# Patient Record
Sex: MALE | Race: WHITE | NOT HISPANIC OR LATINO | URBAN - METROPOLITAN AREA
[De-identification: names, ages, dates, MRNs, and addresses within clinical notes are randomized per-mention and may not be internally consistent; named-entity substitution may affect disease eponyms.]

---

## 2018-07-23 ENCOUNTER — OUTPATIENT (OUTPATIENT)
Dept: OUTPATIENT SERVICES | Facility: HOSPITAL | Age: 53
LOS: 1 days | Discharge: HOME | End: 2018-07-23
Payer: COMMERCIAL

## 2018-07-23 VITALS
OXYGEN SATURATION: 97 % | DIASTOLIC BLOOD PRESSURE: 78 MMHG | RESPIRATION RATE: 16 BRPM | SYSTOLIC BLOOD PRESSURE: 125 MMHG | WEIGHT: 240.08 LBS | HEART RATE: 61 BPM | TEMPERATURE: 98 F | HEIGHT: 73 IN

## 2018-07-23 DIAGNOSIS — I25.9 CHRONIC ISCHEMIC HEART DISEASE, UNSPECIFIED: ICD-10-CM

## 2018-07-23 DIAGNOSIS — Z95.5 PRESENCE OF CORONARY ANGIOPLASTY IMPLANT AND GRAFT: Chronic | ICD-10-CM

## 2018-07-23 DIAGNOSIS — Z90.89 ACQUIRED ABSENCE OF OTHER ORGANS: Chronic | ICD-10-CM

## 2018-07-23 LAB
ANION GAP SERPL CALC-SCNC: 11 MMOL/L — SIGNIFICANT CHANGE UP (ref 5–17)
BUN SERPL-MCNC: 23 MG/DL — SIGNIFICANT CHANGE UP (ref 7–23)
CALCIUM SERPL-MCNC: 9.2 MG/DL — SIGNIFICANT CHANGE UP (ref 8.4–10.5)
CHLORIDE SERPL-SCNC: 107 MMOL/L — SIGNIFICANT CHANGE UP (ref 96–108)
CO2 SERPL-SCNC: 24 MMOL/L — SIGNIFICANT CHANGE UP (ref 22–31)
CREAT SERPL-MCNC: 1.15 MG/DL — SIGNIFICANT CHANGE UP (ref 0.5–1.3)
GLUCOSE SERPL-MCNC: 98 MG/DL — SIGNIFICANT CHANGE UP (ref 70–99)
HCT VFR BLD CALC: 46.9 % — SIGNIFICANT CHANGE UP (ref 39–50)
HGB BLD-MCNC: 14.8 G/DL — SIGNIFICANT CHANGE UP (ref 13–17)
MCHC RBC-ENTMCNC: 28.7 PG — SIGNIFICANT CHANGE UP (ref 27–34)
MCHC RBC-ENTMCNC: 31.5 GM/DL — LOW (ref 32–36)
MCV RBC AUTO: 91.1 FL — SIGNIFICANT CHANGE UP (ref 80–100)
PLATELET # BLD AUTO: 230 K/UL — SIGNIFICANT CHANGE UP (ref 150–400)
POTASSIUM SERPL-MCNC: 4.5 MMOL/L — SIGNIFICANT CHANGE UP (ref 3.5–5.3)
POTASSIUM SERPL-SCNC: 4.5 MMOL/L — SIGNIFICANT CHANGE UP (ref 3.5–5.3)
RBC # BLD: 5.15 M/UL — SIGNIFICANT CHANGE UP (ref 4.2–5.8)
RBC # FLD: 11.8 % — SIGNIFICANT CHANGE UP (ref 10.3–14.5)
SODIUM SERPL-SCNC: 142 MMOL/L — SIGNIFICANT CHANGE UP (ref 135–145)
WBC # BLD: 7.3 K/UL — SIGNIFICANT CHANGE UP (ref 3.8–10.5)
WBC # FLD AUTO: 7.3 K/UL — SIGNIFICANT CHANGE UP (ref 3.8–10.5)

## 2018-07-23 PROCEDURE — 85027 COMPLETE CBC AUTOMATED: CPT

## 2018-07-23 PROCEDURE — C1769: CPT

## 2018-07-23 PROCEDURE — 80048 BASIC METABOLIC PNL TOTAL CA: CPT

## 2018-07-23 PROCEDURE — 93458 L HRT ARTERY/VENTRICLE ANGIO: CPT | Mod: 26

## 2018-07-23 PROCEDURE — C1887: CPT

## 2018-07-23 PROCEDURE — 93005 ELECTROCARDIOGRAM TRACING: CPT

## 2018-07-23 PROCEDURE — 93010 ELECTROCARDIOGRAM REPORT: CPT

## 2018-07-23 PROCEDURE — C1894: CPT

## 2018-07-23 PROCEDURE — 93458 L HRT ARTERY/VENTRICLE ANGIO: CPT

## 2018-07-23 RX ORDER — LOSARTAN POTASSIUM 100 MG/1
1 TABLET, FILM COATED ORAL
Qty: 0 | Refills: 0 | COMMUNITY

## 2018-07-23 NOTE — H&P CARDIOLOGY - PMH
Coronary artery disease involving native coronary artery of native heart, angina presence unspecified    HTN (hypertension)

## 2018-07-23 NOTE — H&P CARDIOLOGY - FAMILY HISTORY
Family history of lung cancer     Family history of hypertension     Sibling  Still living? Yes, Estimated age: Age Unknown  Family history of CABG, Age at diagnosis: Age Unknown

## 2018-07-23 NOTE — H&P CARDIOLOGY - HISTORY OF PRESENT ILLNESS
54 y/o  male PMH CAD with prior PCI of OM and RCA (6/2015), HTN, with c/o "pulling" left sided non-radiating chest pain over the past 2 months. Pain is not associated with exertion. Denies associated dyspnea, dizziness, palpitations. Seen and evaluated by cardiologist, Dr. Robb, and had a stress test negative for ischemia (pt notes he had a normal stress test prior to last PCI). Echo revealed wall motion abnormalities with apical anterior, apical septal, apical inferior, and apical lateral hypokinesis. Referred for cardiac catheterization today for evaluation of symptoms. 52 y/o  male PMH CAD with prior PCI of OM and RCA (6/2015), HTN, with c/o "pulling" left sided non-radiating chest pain over the past 2 months. Pain is not associated with exertion. Denies associated dyspnea, dizziness, palpitations. Seen and evaluated by cardiologist, Dr. Robb, and had a stress test negative for ischemia (pt notes he had a normal stress test prior to last PCI). Echo revealed wall motion abnormalities with apical anterior, apical septal, apical inferior, and apical lateral hypokinesis. Referred for cardiac catheterization today for evaluation of symptoms.   < from: Cardiac Cath Lab - Adult (07.29.15 @ 09:14) >  VENTRICLES: No left ventriculogram was performed.  CORONARY VESSELS: The coronary circulation is right dominant.  LM:   --  LM: Normal.  LAD:   --  Mid LAD: There was a 30 % stenosis.  CX:   --  OM1: Angiography showed minor luminal irregularities with no flow  limiting lesions.  --  OM2: There was a 80 % stenosis.  RCA:   --  Proximal RCA: Angiography showed minor luminal irregularities  with no flow limiting lesions.  --  Mid RCA: There was a discrete 99 % stenosis.  --  Distal RCA: Angiography showed minor luminal irregularities with no  flow limiting lesions.  COMPLICATIONS: There were no complications.  DIAGNOSTIC IMPRESSIONS: Critical OM2 and RCA disease - dual culprits for  UA. Normal EF on Echo.  DIAGNOSTIC RECOMMENDATIONS: Aspirin and Plavix. Stagged PCI to dual culprit  OM.    < end of copied text > 52 y/o  male PMH CAD with prior PCI of OM and RCA (7/2015), HTN, with c/o "pulling" left sided non-radiating chest pain over the past 2 months. Pain is not associated with exertion. Denies associated dyspnea, dizziness, palpitations. Seen and evaluated by cardiologist, Dr. Robb, and had a stress test negative for ischemia (pt notes he had a normal stress test prior to last PCI). Echo revealed wall motion abnormalities with apical anterior, apical septal, apical inferior, and apical lateral hypokinesis. Referred for cardiac catheterization today for evaluation of symptoms.   < from: Cardiac Cath Lab - Adult (07.29.15 @ 09:14) >  VENTRICLES: No left ventriculogram was performed.  CORONARY VESSELS: The coronary circulation is right dominant.  LM:   --  LM: Normal.  LAD:   --  Mid LAD: There was a 30 % stenosis.  CX:   --  OM1: Angiography showed minor luminal irregularities with no flow  limiting lesions.  --  OM2: There was a 80 % stenosis.  RCA:   --  Proximal RCA: Angiography showed minor luminal irregularities  with no flow limiting lesions.  --  Mid RCA: There was a discrete 99 % stenosis.  --  Distal RCA: Angiography showed minor luminal irregularities with no  flow limiting lesions.  COMPLICATIONS: There were no complications.  DIAGNOSTIC IMPRESSIONS: Critical OM2 and RCA disease - dual culprits for  UA. Normal EF on Echo.  DIAGNOSTIC RECOMMENDATIONS: Aspirin and Plavix. Stagged PCI to dual culprit  OM.    < end of copied text >